# Patient Record
Sex: FEMALE | Race: WHITE | NOT HISPANIC OR LATINO
[De-identification: names, ages, dates, MRNs, and addresses within clinical notes are randomized per-mention and may not be internally consistent; named-entity substitution may affect disease eponyms.]

---

## 2017-12-25 ENCOUNTER — HOSPITAL (OUTPATIENT)
Dept: OTHER | Age: 72
End: 2017-12-25
Attending: EMERGENCY MEDICINE

## 2018-05-09 ENCOUNTER — HOSPITAL (OUTPATIENT)
Dept: OTHER | Age: 73
End: 2018-05-09
Attending: INTERNAL MEDICINE

## 2018-06-01 ENCOUNTER — HOSPITAL (OUTPATIENT)
Dept: OTHER | Age: 73
End: 2018-06-01
Attending: INTERNAL MEDICINE

## 2019-03-26 ENCOUNTER — ORDER TRANSCRIPTION (OUTPATIENT)
Dept: WOUND CARE | Facility: HOSPITAL | Age: 74
End: 2019-03-26

## 2019-03-26 DIAGNOSIS — I89.0 LYMPHEDEMA: Primary | ICD-10-CM

## 2019-03-27 ENCOUNTER — NURSE ONLY (OUTPATIENT)
Dept: WOUND CARE | Facility: HOSPITAL | Age: 74
End: 2019-03-27
Attending: CLINICAL NURSE SPECIALIST
Payer: MEDICARE

## 2019-03-27 DIAGNOSIS — I89.0 LYMPHEDEMA: ICD-10-CM

## 2019-03-27 PROCEDURE — 99214 OFFICE O/P EST MOD 30 MIN: CPT

## 2019-03-27 NOTE — PROGRESS NOTES
Subjective    Chief Complaint  This information was obtained from the patient  The patient is new to the [address] [facility],[address] here for an initial visit for the evaluation and management of non-healing wound(s).     Allergies  clindamycin (Reaction: rash), Flagyl (R Eyes: Other (wears glasses/Patient to have cataract surgery in September)  Cardiovascular (Central/Peripheral):  Other (Denies heart issues)  Gastrointestinal (GI): Other (HX: colon resection), Constipation, Diarrhea  Genitourinary (): Urinary Incontinenc Height/Length: 63 in (160.02 cm), Weight: 312 lbs (141.82 kgs), BMI: 55.3, Temperature: 98.2 °F (36.78 °C), Pulse: 103 bpm, Respiratory Rate: 18 breaths/min, Blood Pressure: 154/79 mmHg, Pulse Oximetry: 99 %. Respiratory:  Respiration regular.  clear on Appropriate judgement and insight. Oriented to time, place and person. Appropriate mood and affect.      Lower Extremity Assessment  Edema Assessment:  Left Extremity: Edema is present  Compression Device In Use: Yes  Device Used Correctly: Yes  Compression Patient verbalized she has a hard time getting on nylon compression stockings and prefers cotton compression stockings.   Instructions  on purchasing new compression stockings more frequently due to loss of elasticity of the stockings patient had brought in Other: - Keep dressings clean and dry    Follow-Up Appointments:  A follow-up appointment should be scheduled.         Patient to follow-up in outpatient wound clinic in 2 weeks for monitoring of close blisters for resolution, compliance with purchasing cot

## 2019-04-10 ENCOUNTER — APPOINTMENT (OUTPATIENT)
Dept: WOUND CARE | Facility: HOSPITAL | Age: 74
End: 2019-04-10
Attending: CLINICAL NURSE SPECIALIST
Payer: MEDICARE

## 2019-04-11 ENCOUNTER — NURSE ONLY (OUTPATIENT)
Dept: WOUND CARE | Facility: HOSPITAL | Age: 74
End: 2019-04-11
Attending: CLINICAL NURSE SPECIALIST
Payer: MEDICARE

## 2019-04-11 DIAGNOSIS — I89.0 LYMPHEDEMA: Primary | ICD-10-CM

## 2019-04-11 PROCEDURE — 99213 OFFICE O/P EST LOW 20 MIN: CPT

## 2019-04-11 NOTE — PROGRESS NOTES
Subjective    Chief Complaint  This information was obtained from the patient  The patient is new to the [address] [facility],[address] here for an initial visit for the evaluation and management of non-healing wound(s).  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  The periwound skin did not exhibit: Brawny Induration, Excoriation, Induration, Callus, Crepitus, Fluctuance, Friable, Rash, Dry/Scaly, Moist, Maceration, Atrophie Alice, Cyanosis, Ecchymosis, Erythema, Hemosiderosis, Patient presents to the outpatient wound clinic with complaints of BLE are not \"looking good\". Patient has HX of Lymphedema with small patches of hemosiderin depostis/erythema areas and small closed thick skin blisters to BLE. No open wounds noted.  Nadiya

## 2019-04-15 ENCOUNTER — TELEPHONE (OUTPATIENT)
Dept: PHYSICAL THERAPY | Facility: HOSPITAL | Age: 74
End: 2019-04-15

## 2019-04-15 NOTE — TELEPHONE ENCOUNTER
Returned pt's call- wanting to schedule lymphedema therapy and wanting information on treatment. Discussed w/ pt therapy would involve compression, MLD, self care, and exercise.  Patient concerned on compression \"I can't buy the wraps now because I don't

## 2019-04-15 NOTE — TELEPHONE ENCOUNTER
Called patient back- gave her the number to [address] [facility] as this is close to her house as well. Encouraged pt to follow through with doing the lymphedema therapy including the bandaging.  Patient more agreeable with thoughts of being able to go